# Patient Record
Sex: FEMALE | Race: WHITE | NOT HISPANIC OR LATINO | Employment: UNEMPLOYED | URBAN - METROPOLITAN AREA
[De-identification: names, ages, dates, MRNs, and addresses within clinical notes are randomized per-mention and may not be internally consistent; named-entity substitution may affect disease eponyms.]

---

## 2023-01-01 ENCOUNTER — OFFICE VISIT (OUTPATIENT)
Dept: GYNECOLOGIC ONCOLOGY | Facility: CLINIC | Age: 48
End: 2023-01-01
Payer: MEDICARE

## 2023-01-01 ENCOUNTER — HOSPITAL ENCOUNTER (OUTPATIENT)
Dept: DATA CONVERSION | Facility: HOSPITAL | Age: 48
End: 2023-05-10
Attending: SURGERY | Admitting: SURGERY
Payer: MEDICARE

## 2023-01-01 ENCOUNTER — HOSPITAL ENCOUNTER (OUTPATIENT)
Dept: DATA CONVERSION | Facility: HOSPITAL | Age: 48
End: 2023-05-01
Attending: STUDENT IN AN ORGANIZED HEALTH CARE EDUCATION/TRAINING PROGRAM | Admitting: STUDENT IN AN ORGANIZED HEALTH CARE EDUCATION/TRAINING PROGRAM
Payer: MEDICARE

## 2023-01-01 VITALS
OXYGEN SATURATION: 97 % | HEART RATE: 99 BPM | BODY MASS INDEX: 17.51 KG/M2 | DIASTOLIC BLOOD PRESSURE: 82 MMHG | RESPIRATION RATE: 16 BRPM | SYSTOLIC BLOOD PRESSURE: 113 MMHG | HEIGHT: 59 IN | TEMPERATURE: 98.6 F | WEIGHT: 86.86 LBS

## 2023-01-01 VITALS
TEMPERATURE: 98.6 F | HEART RATE: 82 BPM | WEIGHT: 80.14 LBS | OXYGEN SATURATION: 95 % | BODY MASS INDEX: 16.82 KG/M2 | HEIGHT: 58 IN | DIASTOLIC BLOOD PRESSURE: 97 MMHG | SYSTOLIC BLOOD PRESSURE: 150 MMHG | RESPIRATION RATE: 17 BRPM

## 2023-01-01 VITALS
HEART RATE: 95 BPM | RESPIRATION RATE: 16 BRPM | SYSTOLIC BLOOD PRESSURE: 139 MMHG | BODY MASS INDEX: 16.64 KG/M2 | OXYGEN SATURATION: 98 % | DIASTOLIC BLOOD PRESSURE: 90 MMHG | TEMPERATURE: 97.2 F | WEIGHT: 80.47 LBS

## 2023-01-01 VITALS — HEIGHT: 59 IN | BODY MASS INDEX: 17.78 KG/M2 | WEIGHT: 88.18 LBS

## 2023-01-01 VITALS — BODY MASS INDEX: 19.5 KG/M2 | HEIGHT: 57 IN | WEIGHT: 90.39 LBS

## 2023-01-01 DIAGNOSIS — Z53.8 PROCEDURE AND TREATMENT NOT CARRIED OUT FOR OTHER REASONS: ICD-10-CM

## 2023-01-01 DIAGNOSIS — Z85.43 PERSONAL HISTORY OF MALIGNANT NEOPLASM OF OVARY: ICD-10-CM

## 2023-01-01 DIAGNOSIS — Z43.1 ENCOUNTER FOR ATTENTION TO GASTROSTOMY (MULTI): ICD-10-CM

## 2023-01-01 DIAGNOSIS — R18.0 MALIGNANT ASCITES (CMS-HCC): ICD-10-CM

## 2023-01-01 DIAGNOSIS — K25.9 GASTRIC ULCER, UNSPECIFIED AS ACUTE OR CHRONIC, WITHOUT HEMORRHAGE OR PERFORATION: ICD-10-CM

## 2023-01-01 DIAGNOSIS — C56.9 MALIGNANT NEOPLASM OF OVARY, UNSPECIFIED LATERALITY (MULTI): Primary | ICD-10-CM

## 2023-01-01 DIAGNOSIS — C57.9: ICD-10-CM

## 2023-01-01 DIAGNOSIS — F31.9 BIPOLAR AFFECTIVE DISORDER, REMISSION STATUS UNSPECIFIED (MULTI): ICD-10-CM

## 2023-01-01 DIAGNOSIS — C56.9 MALIGNANT NEOPLASM OF UNSPECIFIED OVARY (MULTI): ICD-10-CM

## 2023-01-01 DIAGNOSIS — Z93.1 GASTROSTOMY STATUS (MULTI): ICD-10-CM

## 2023-01-01 DIAGNOSIS — F17.210 NICOTINE DEPENDENCE, CIGARETTES, UNCOMPLICATED: ICD-10-CM

## 2023-01-01 DIAGNOSIS — I10 ESSENTIAL (PRIMARY) HYPERTENSION: ICD-10-CM

## 2023-01-01 PROCEDURE — 99213 OFFICE O/P EST LOW 20 MIN: CPT | Performed by: STUDENT IN AN ORGANIZED HEALTH CARE EDUCATION/TRAINING PROGRAM

## 2023-01-01 PROCEDURE — 99214 OFFICE O/P EST MOD 30 MIN: CPT | Performed by: STUDENT IN AN ORGANIZED HEALTH CARE EDUCATION/TRAINING PROGRAM

## 2023-01-01 PROCEDURE — 3074F SYST BP LT 130 MM HG: CPT | Performed by: STUDENT IN AN ORGANIZED HEALTH CARE EDUCATION/TRAINING PROGRAM

## 2023-01-01 PROCEDURE — 3077F SYST BP >= 140 MM HG: CPT | Performed by: STUDENT IN AN ORGANIZED HEALTH CARE EDUCATION/TRAINING PROGRAM

## 2023-01-01 PROCEDURE — 3080F DIAST BP >= 90 MM HG: CPT | Performed by: STUDENT IN AN ORGANIZED HEALTH CARE EDUCATION/TRAINING PROGRAM

## 2023-01-01 PROCEDURE — 3075F SYST BP GE 130 - 139MM HG: CPT | Performed by: STUDENT IN AN ORGANIZED HEALTH CARE EDUCATION/TRAINING PROGRAM

## 2023-01-01 PROCEDURE — 3079F DIAST BP 80-89 MM HG: CPT | Performed by: STUDENT IN AN ORGANIZED HEALTH CARE EDUCATION/TRAINING PROGRAM

## 2023-01-01 RX ORDER — PANTOPRAZOLE SODIUM 40 MG/1
40 TABLET, DELAYED RELEASE ORAL DAILY
COMMUNITY

## 2023-01-01 RX ORDER — FAMOTIDINE 20 MG/1
20 TABLET, FILM COATED ORAL
COMMUNITY
Start: 2022-04-15

## 2023-01-01 RX ORDER — BUSPIRONE HYDROCHLORIDE 5 MG/1
5 TABLET ORAL 3 TIMES DAILY PRN
COMMUNITY
Start: 2023-01-09

## 2023-01-01 RX ORDER — ALBUTEROL SULFATE 90 UG/1
2 AEROSOL, METERED RESPIRATORY (INHALATION) EVERY 6 HOURS PRN
COMMUNITY

## 2023-01-01 RX ORDER — IBUPROFEN 600 MG/1
TABLET ORAL
COMMUNITY
Start: 2022-03-10

## 2023-01-01 RX ORDER — OXYCODONE HYDROCHLORIDE 5 MG/1
1-2 TABLET ORAL EVERY 4 HOURS PRN
COMMUNITY

## 2023-01-01 RX ORDER — DEXAMETHASONE 4 MG/1
4 TABLET ORAL DAILY
COMMUNITY
Start: 2023-01-01

## 2023-01-01 RX ORDER — LETROZOLE 2.5 MG/1
2.5 TABLET, FILM COATED ORAL DAILY
Qty: 30 TABLET | Refills: 3 | Status: SHIPPED | OUTPATIENT
Start: 2023-01-01 | End: 2023-01-01

## 2023-01-01 RX ORDER — LETROZOLE 2.5 MG/1
2.5 TABLET, FILM COATED ORAL DAILY
COMMUNITY
End: 2023-01-01 | Stop reason: SDUPTHER

## 2023-01-01 RX ORDER — SERTRALINE HYDROCHLORIDE 100 MG/1
100 TABLET, FILM COATED ORAL DAILY
COMMUNITY

## 2023-01-01 RX ORDER — HYDROCODONE BITARTRATE AND ACETAMINOPHEN 5; 325 MG/1; MG/1
TABLET ORAL
COMMUNITY
Start: 2022-03-02

## 2023-01-01 RX ORDER — QUETIAPINE FUMARATE 25 MG/1
TABLET, FILM COATED ORAL
COMMUNITY
Start: 2022-03-10

## 2023-01-01 RX ORDER — HYDROCHLOROTHIAZIDE 12.5 MG/1
12.5 TABLET ORAL 2 TIMES DAILY
COMMUNITY
Start: 2022-10-19

## 2023-01-01 RX ORDER — METHADONE HYDROCHLORIDE 5 MG/1
2.5 TABLET ORAL ONCE
COMMUNITY

## 2023-01-01 RX ORDER — MIRTAZAPINE 15 MG/1
7.5 TABLET, FILM COATED ORAL NIGHTLY
COMMUNITY
Start: 2023-01-01

## 2023-01-01 RX ORDER — LIDOCAINE AND PRILOCAINE 25; 25 MG/G; MG/G
CREAM TOPICAL
COMMUNITY
Start: 2022-03-15

## 2023-01-01 RX ORDER — METHADONE HYDROCHLORIDE 5 MG/1
5 TABLET ORAL ONCE
COMMUNITY

## 2023-01-01 RX ORDER — ACETAMINOPHEN 500 MG
5 TABLET ORAL NIGHTLY PRN
COMMUNITY
Start: 2022-10-29

## 2023-01-01 RX ORDER — POLYETHYLENE GLYCOL 3350 17 G/17G
17 POWDER, FOR SOLUTION ORAL AS NEEDED
COMMUNITY
Start: 2022-05-24

## 2023-01-01 ASSESSMENT — ENCOUNTER SYMPTOMS
DEPRESSION: 0
OCCASIONAL FEELINGS OF UNSTEADINESS: 0
LOSS OF SENSATION IN FEET: 0
DEPRESSION: 0
OCCASIONAL FEELINGS OF UNSTEADINESS: 0
DEPRESSION: 0
OCCASIONAL FEELINGS OF UNSTEADINESS: 1
LOSS OF SENSATION IN FEET: 0
LOSS OF SENSATION IN FEET: 0

## 2023-01-01 ASSESSMENT — PAIN SCALES - GENERAL
PAINLEVEL: 0-NO PAIN
PAINLEVEL: 0-NO PAIN
PAINLEVEL: 4

## 2023-09-27 PROBLEM — R07.9 CHEST PAIN: Status: ACTIVE | Noted: 2023-01-01

## 2023-09-27 PROBLEM — I10 HYPERTENSION: Status: ACTIVE | Noted: 2022-04-15

## 2023-09-27 PROBLEM — N83.8 ENLARGED OVARY: Status: ACTIVE | Noted: 2018-07-09

## 2023-09-27 PROBLEM — E66.9 OBESITY: Status: ACTIVE | Noted: 2023-01-01

## 2023-09-27 PROBLEM — C56.9 OVARIAN CANCER (MULTI): Status: ACTIVE | Noted: 2022-04-15

## 2023-09-27 PROBLEM — R06.00 DYSPNEA: Status: ACTIVE | Noted: 2023-01-01

## 2023-09-27 PROBLEM — C57.9: Status: ACTIVE | Noted: 2023-01-01

## 2023-09-27 PROBLEM — F31.9 BIPOLAR DISORDER (MULTI): Status: ACTIVE | Noted: 2023-01-01

## 2023-09-27 PROBLEM — F41.1 GENERALIZED ANXIETY DISORDER: Status: ACTIVE | Noted: 2023-01-01

## 2023-09-27 PROBLEM — F41.9 ANXIETY: Status: ACTIVE | Noted: 2023-01-01

## 2023-09-27 PROBLEM — R18.8 ASCITES: Status: ACTIVE | Noted: 2023-01-01

## 2023-09-27 PROBLEM — F33.2 SEVERE EPISODE OF RECURRENT MAJOR DEPRESSIVE DISORDER, WITHOUT PSYCHOTIC FEATURES (MULTI): Status: ACTIVE | Noted: 2023-01-01

## 2023-10-03 NOTE — PROGRESS NOTES
"Radha Perry is a 48 y.o. female on day 0 of admission presenting with No Principal Problem: There is no principal problem currently on the Problem List. Please update the Problem List and refresh..      Subjective    Interval History:   She is experiencing swelling in her feet and soreness and stiffness in her legs, her hydrochlorothiazide was titrated and she is feeling abdominal discomfort, was planning to see a chiropractor and massage therapy and unfortunately had flat tires to fix, continues to work.    PMH: obesity, bipolar disorder, low grade serous ovarian cancer  PSH: denies  Meds: sertraline, Seroquel, ibuprofen, lidocaine patch,   All: NKDA  SocHx: Has moved back to [place], currently living in Hotel with her boyfriend (Charles).  Still smoking 1ppd.  Has complicated relationship with her mother, currently does not wish  her at all involved in her health care.       Objective     Oncology History Overview Note   She underwent exploratory laparotomy and aborted debulking procedure on 3/28/2022 for unresectable burden of disease.  Declined chemotherapy and opted for hormonal therapy.  Letrozole initiated 4/2022.   Now on hospice     Ovarian cancer (CMS/[facility])   4/15/2022 Initial Diagnosis    Ovarian cancer (CMS/[facility])         Last Recorded Vitals  Blood pressure 113/82, pulse 99, temperature 37 °C (98.6 °F), temperature source Temporal, resp. rate 16, height 1.489 m (4' 10.62\"), weight (!) 39.4 kg (86 lb 13.8 oz), SpO2 97 %.  Intake/Output last 3 Shifts:  [unfilled]    Physical Exam  Constitutional:       General: She is not in acute distress.     Appearance: Normal appearance.      Comments: Thin   HENT:      Head: Normocephalic.      Nose: Nose normal.      Mouth/Throat:      Mouth: Mucous membranes are moist.      Pharynx: Oropharynx is clear.   Eyes:      Pupils: Pupils are equal, round, and reactive to light.   Cardiovascular:      Rate and Rhythm: Normal rate and regular rhythm.      Heart sounds: No murmur " heard.     No friction rub. No gallop.   Pulmonary:      Effort: Pulmonary effort is normal.   Abdominal:      General: There is distension.      Tenderness: There is no abdominal tenderness. There is no rebound.      Hernia: No hernia is present.   Musculoskeletal:         General: Normal range of motion.      Cervical back: Normal range of motion.   Skin:     General: Skin is warm and dry.   Neurological:      Mental Status: She is alert and oriented to person, place, and time.   Psychiatric:         Mood and Affect: Mood normal.            Assessment/Plan   48 y.o.  woman with history of bipolar disorder and low grade serous ovarian cancer, presenting for follow up.     #LGSOC  - Previously we reviewed her diagnosis, relative chemo insensitive disease, unresectable tumor burden.  We discussed options for treatment could include traditional chemotherapy, hormonal treatments, or clinical trials.  Patient not interested in referrals  to explore clinical trials.  - We have reviewed the relative chemo insensitive nature of this disease and she expressed her desire to avoid burdensome toxicity at this time  - Initiated hormonal therapy with letrozole 4/2022, she wishes to continue letrozole out of pocket currently  - Followed by supportive onc     #Anxiety  - Was able to establish with psychiatrist at [facility]      #Lower back pain  - Followed by supportive onc        Follow-up in 1 month.            I spent 20 minutes in the professional and overall care of this patient.      Carolina Ho MD    Scribe Attestation  By signing my name below, I, Coleen Lundy   attest that this documentation has been prepared under the direction and in the presence of Carolina Ho MD.    Provider Attestation - Scribe documentation    All medical record entries made by the Scribe were at my direction and personally dictated by me. I have reviewed the chart and agree that the record accurately reflects my personal  performance of the history, physical exam, discussion and plan.    Carolina Ho MD
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
## 2023-12-11 NOTE — PROGRESS NOTES
Radha Perry is a 48 y.o. female on day 0 of admission presenting with No Principal Problem: There is no principal problem currently on the Problem List. Please update the Problem List and refresh..      Subjective    Interval History:   She is doing good overall, she continues to have back pain and tries to have a massage every week and her hospice nurse biweekly, and she has a [place] cancer group Zoom chat monthly and her leg muscles are weak and she struggles with the stairs.      PMH: obesity, bipolar disorder, low grade serous ovarian cancer  PSH: denies  Meds: sertraline, Seroquel, ibuprofen, lidocaine patch,   All: NKDA  SocHx: Has moved back to [place], currently living in [place] with her boyfriend (Charles).  Still smoking 1ppd.  Has complicated relationship with her mother, currently does not wish  her at all involved in her health care.       Objective     Oncology History Overview Note   She underwent exploratory laparotomy and aborted debulking procedure on 3/28/2022 for unresectable burden of disease.  Declined chemotherapy and opted for hormonal therapy.  Letrozole initiated 4/2022.   Now on hospice     Ovarian cancer (CMS/[facility])   4/15/2022 Initial Diagnosis    Ovarian cancer (CMS/[facility])         Last Recorded Vitals  Blood pressure 139/90, pulse 95, temperature 36.2 °C (97.2 °F), temperature source Temporal, resp. rate 16, weight (!) 36.5 kg (80 lb 7.5 oz), SpO2 98 %.  Intake/Output last 3 Shifts:  [unfilled]    Physical Exam  Constitutional:       General: She is not in acute distress.     Appearance: Normal appearance.      Comments: Thin   HENT:      Head: Normocephalic.      Nose: Nose normal.      Mouth/Throat:      Mouth: Mucous membranes are moist.      Pharynx: Oropharynx is clear.   Eyes:      Pupils: Pupils are equal, round, and reactive to light.   Cardiovascular:      Rate and Rhythm: Normal rate and regular rhythm.      Heart sounds: No murmur heard.     No friction rub. No gallop.   Pulmonary:       Effort: Pulmonary effort is normal.   Abdominal:      General: There is distension.      Tenderness: There is no abdominal tenderness. There is no rebound.      Hernia: No hernia is present.   Musculoskeletal:         General: Normal range of motion.      Cervical back: Normal range of motion.   Skin:     General: Skin is warm and dry.   Neurological:      Mental Status: She is alert and oriented to person, place, and time.   Psychiatric:         Mood and Affect: Mood normal.            Assessment/Plan   48 y.o.  woman with history of bipolar disorder and low grade serous ovarian cancer, presenting for follow up.     #LGSOC  - Previously we reviewed her diagnosis, relative chemo insensitive disease, unresectable tumor burden.  We discussed options for treatment could include traditional chemotherapy, hormonal treatments, or clinical trials.  Patient not interested in referrals  to explore clinical trials.  - We have reviewed the relative chemo insensitive nature of this disease and she expressed her desire to avoid burdensome toxicity at this time  - Initiated hormonal therapy with letrozole 4/2022, she wishes to continue letrozole out of pocket currently  - Followed by supportive onc  - On Hospice     #Anxiety  - Was able to establish with psychiatrist at [facility]      #Lower back pain  - Followed by supportive onc       Scribe Attestation  By signing my name below, I, Coleen Lundy   attest that this documentation has been prepared under the direction and in the presence of Carolina Ho MD.     Provider Attestation - Scribe documentation    All medical record entries made by the Scribe were at my direction and personally dictated by me. I have reviewed the chart and agree that the record accurately reflects my personal performance of the history, physical exam, discussion and plan.    Carolina Ho MD

## 2024-01-23 ENCOUNTER — APPOINTMENT (OUTPATIENT)
Dept: GYNECOLOGIC ONCOLOGY | Facility: CLINIC | Age: 49
End: 2024-01-23
Payer: MEDICARE